# Patient Record
Sex: FEMALE | Race: WHITE | NOT HISPANIC OR LATINO | Employment: OTHER | URBAN - METROPOLITAN AREA
[De-identification: names, ages, dates, MRNs, and addresses within clinical notes are randomized per-mention and may not be internally consistent; named-entity substitution may affect disease eponyms.]

---

## 2020-08-18 ENCOUNTER — RECORDS - HEALTHEAST (OUTPATIENT)
Dept: LAB | Facility: CLINIC | Age: 80
End: 2020-08-18

## 2020-08-18 LAB
ALBUMIN SERPL-MCNC: 4.3 G/DL (ref 3.5–5)
ALP SERPL-CCNC: 68 U/L (ref 45–120)
ALT SERPL W P-5'-P-CCNC: 12 U/L (ref 0–45)
ANION GAP SERPL CALCULATED.3IONS-SCNC: 11 MMOL/L (ref 5–18)
AST SERPL W P-5'-P-CCNC: 20 U/L (ref 0–40)
BILIRUB SERPL-MCNC: 0.6 MG/DL (ref 0–1)
BUN SERPL-MCNC: 13 MG/DL (ref 8–28)
CALCIUM SERPL-MCNC: 9.3 MG/DL (ref 8.5–10.5)
CHLORIDE BLD-SCNC: 105 MMOL/L (ref 98–107)
CHOLEST SERPL-MCNC: 238 MG/DL
CO2 SERPL-SCNC: 25 MMOL/L (ref 22–31)
CREAT SERPL-MCNC: 0.7 MG/DL (ref 0.6–1.1)
FASTING STATUS PATIENT QL REPORTED: ABNORMAL
GFR SERPL CREATININE-BSD FRML MDRD: >60 ML/MIN/1.73M2
GLUCOSE BLD-MCNC: 104 MG/DL (ref 70–125)
HDLC SERPL-MCNC: 48 MG/DL
LDLC SERPL CALC-MCNC: 160 MG/DL
POTASSIUM BLD-SCNC: 4.3 MMOL/L (ref 3.5–5)
PROT SERPL-MCNC: 6.9 G/DL (ref 6–8)
SODIUM SERPL-SCNC: 141 MMOL/L (ref 136–145)
TRIGL SERPL-MCNC: 151 MG/DL

## 2021-05-13 ENCOUNTER — AMBULATORY - HEALTHEAST (OUTPATIENT)
Dept: VASCULAR SURGERY | Facility: CLINIC | Age: 81
End: 2021-05-13
Payer: COMMERCIAL

## 2021-05-13 DIAGNOSIS — S81.812A LACERATION OF LEFT LOWER LEG: ICD-10-CM

## 2021-05-20 ENCOUNTER — RECORDS - HEALTHEAST (OUTPATIENT)
Dept: ADMINISTRATIVE | Facility: OTHER | Age: 81
End: 2021-05-20

## 2021-05-20 ENCOUNTER — OFFICE VISIT - HEALTHEAST (OUTPATIENT)
Dept: VASCULAR SURGERY | Facility: CLINIC | Age: 81
End: 2021-05-20

## 2021-05-20 DIAGNOSIS — R60.9 DEPENDENT EDEMA: ICD-10-CM

## 2021-05-20 DIAGNOSIS — S80.12XS TRAUMATIC HEMATOMA OF LEFT LOWER LEG, SEQUELA: ICD-10-CM

## 2021-05-20 DIAGNOSIS — L97.922 ULCER OF LEFT LOWER LEG, WITH FAT LAYER EXPOSED (H): ICD-10-CM

## 2021-05-26 ENCOUNTER — RECORDS - HEALTHEAST (OUTPATIENT)
Dept: ADMINISTRATIVE | Facility: CLINIC | Age: 81
End: 2021-05-26

## 2021-05-27 ENCOUNTER — OFFICE VISIT - HEALTHEAST (OUTPATIENT)
Dept: VASCULAR SURGERY | Facility: CLINIC | Age: 81
End: 2021-05-27

## 2021-05-27 VITALS
RESPIRATION RATE: 16 BRPM | SYSTOLIC BLOOD PRESSURE: 120 MMHG | DIASTOLIC BLOOD PRESSURE: 60 MMHG | TEMPERATURE: 98.4 F | HEART RATE: 68 BPM

## 2021-05-27 VITALS — WEIGHT: 180.8 LBS

## 2021-05-27 DIAGNOSIS — S80.12XS TRAUMATIC HEMATOMA OF LEFT LOWER LEG, SEQUELA: ICD-10-CM

## 2021-05-27 DIAGNOSIS — R60.9 DEPENDENT EDEMA: ICD-10-CM

## 2021-05-27 DIAGNOSIS — L97.922 ULCER OF LEFT LOWER LEG, WITH FAT LAYER EXPOSED (H): ICD-10-CM

## 2021-05-31 ENCOUNTER — RECORDS - HEALTHEAST (OUTPATIENT)
Dept: ADMINISTRATIVE | Facility: CLINIC | Age: 81
End: 2021-05-31

## 2021-06-03 ENCOUNTER — OFFICE VISIT - HEALTHEAST (OUTPATIENT)
Dept: VASCULAR SURGERY | Facility: CLINIC | Age: 81
End: 2021-06-03

## 2021-06-03 DIAGNOSIS — L97.922 ULCER OF LEFT LOWER LEG, WITH FAT LAYER EXPOSED (H): ICD-10-CM

## 2021-06-03 DIAGNOSIS — R60.9 DEPENDENT EDEMA: ICD-10-CM

## 2021-06-03 DIAGNOSIS — S80.12XS TRAUMATIC HEMATOMA OF LEFT LOWER LEG, SEQUELA: ICD-10-CM

## 2021-06-08 ENCOUNTER — COMMUNICATION - HEALTHEAST (OUTPATIENT)
Dept: ADMINISTRATIVE | Facility: CLINIC | Age: 81
End: 2021-06-08

## 2021-06-16 PROBLEM — M54.30 SCIATICA: Status: ACTIVE | Noted: 2021-05-20

## 2021-06-16 PROBLEM — E78.5 HYPERLIPIDEMIA: Status: ACTIVE | Noted: 2021-05-20

## 2021-06-16 PROBLEM — M65.30 ACQUIRED TRIGGER FINGER: Status: ACTIVE | Noted: 2021-05-20

## 2021-06-17 ENCOUNTER — OFFICE VISIT - HEALTHEAST (OUTPATIENT)
Dept: VASCULAR SURGERY | Facility: CLINIC | Age: 81
End: 2021-06-17

## 2021-06-17 DIAGNOSIS — L97.921 ULCER OF LEFT LOWER LEG, LIMITED TO BREAKDOWN OF SKIN (H): ICD-10-CM

## 2021-06-17 DIAGNOSIS — R60.9 DEPENDENT EDEMA: ICD-10-CM

## 2021-06-17 NOTE — PROGRESS NOTES
"4 weeks ago a \"tree table\" fell on her left shin. She uses aquaphor. She is currently on bactrim. Non adherent gauze and abd. She is worried it is infected. It is painful, red and swollen. Washes with antibiotic soap. 8.7x2x0.2  "

## 2021-06-17 NOTE — PATIENT INSTRUCTIONS - HE
"Wound Care Instructions  LEFT LOWER LEG:  Primary Dressing: Impregnate a single piece of 2\" rolled gauze with hydrogel until it is saturated. Tuck into the base of the ulcer until the entire wound is full and the base is covered    Secondary dressing: Cover the rolled gauze dressing with non adherent surgical pad (Telfa).    Secure with rolled gauze or minimal tape    Change twice daily.    Compression:  -Apply tubular compression to left lower leg from toes to knee when out of bed. Can wear at night if tolerated.      Activity:  -Sleep in bed at night.  -Walk as much as tolerated, this is good for edema.    Bathing:  -It is ok to get your wound wet in the shower if you leave the dressings in place. Wash the wound with dilute antibacterial soap separately at the end of the shower. Change wet dressings immediately after showering.    -Do not soak ulcers.  -Do not leave open to air.     SEEK MEDICAL CARE IF:    You have an increase in swelling, pain, or redness around the wound.    You have an increase in the amount of pus coming from the wound.    There is a bad smell coming from the wound.    The wound appears to be worsening/enlarging    You have a fever greater than 101.5 F      It is ok to continue current wound care treatment/products for the next 2-3 days until new wound care supplies are ordered and arrive. If longer than this please contact our office at [phone].    Romina Mendez, MSN, CNP, CWOCN-AP  [facility]  [phone]    "

## 2021-06-25 NOTE — TELEPHONE ENCOUNTER
Caller: Patient    Provider: NICHOLAS Mendez    Detailed reason for call: Please call with an update on her wound supplies. She has not received anything yet.    Best phone number to contact: [phone]    Best time to contact: ASAP. She has many appts for  revovering from surgery and needs to address wound    Ok to leave a detailed message: Yes

## 2021-06-25 NOTE — TELEPHONE ENCOUNTER
Writer spoke with [employer] and they stated that it was delivered today.     Pt confirmed that she received the supplies.

## 2021-06-26 NOTE — PATIENT INSTRUCTIONS - HE
Wound Care Instructions  LEFT LOWER LEG:  Primary Dressing: Xeroform over the remaining ulcer    Secondary dressing: Cover non adherent surgical pad (Telfa).    Secure with rolled gauze or minimal tape    Change daily.    Toe:  Can apply AmLactin 12% lotion or cream (or generic) to callused areas of skin to soften. Daily until desired affect.       Compression:  -Apply tubular compression to left lower leg from toes to knee when out of bed. Can wear at night if tolerated.      Activity:  -Sleep in bed at night.  -Walk as much as tolerated, this is good for edema.    Bathing:  -It is ok to get your wound wet in the shower if you leave the dressings in place. Wash the wound with dilute antibacterial soap separately at the end of the shower. Change wet dressings immediately after showering.    -Do not soak ulcers.  -Do not leave open to air.     SEEK MEDICAL CARE IF:    You have an increase in swelling, pain, or redness around the wound.    You have an increase in the amount of pus coming from the wound.    There is a bad smell coming from the wound.    The wound appears to be worsening/enlarging    You have a fever greater than 101.5 F      It is ok to continue current wound care treatment/products for the next 2-3 days until new wound care supplies are ordered and arrive. If longer than this please contact our office at [phone].    Romina Mendez, MSN, CNP, CWOCN-AP  [facility]  [phone]

## 2021-06-26 NOTE — PATIENT INSTRUCTIONS - HE
"Wound Care Instructions  LEFT LOWER LEG:  Primary Dressing: Impregnate a single piece of 2\" rolled gauze with hydrogel until it is saturated. Tuck into the base of the ulcer until the entire wound is full and the base is covered    Secondary dressing: Cover the rolled gauze dressing with non adherent surgical pad (Telfa).    Secure with rolled gauze or minimal tape    Change twice daily.    Compression:  -Apply tubular compression to left lower leg from toes to knee when out of bed. Can wear at night if tolerated.      Activity:  -Sleep in bed at night.  -Walk as much as tolerated, this is good for edema.    Bathing:  -It is ok to get your wound wet in the shower if you leave the dressings in place. Wash the wound with dilute antibacterial soap separately at the end of the shower. Change wet dressings immediately after showering.    -Do not soak ulcers.  -Do not leave open to air.     SEEK MEDICAL CARE IF:    You have an increase in swelling, pain, or redness around the wound.    You have an increase in the amount of pus coming from the wound.    There is a bad smell coming from the wound.    The wound appears to be worsening/enlarging    You have a fever greater than 101.5 F      It is ok to continue current wound care treatment/products for the next 2-3 days until new wound care supplies are ordered and arrive. If longer than this please contact our office at [phone].    Romina Mendez, MSN, CNP, CWOCN-AP  [facility]  [phone]    "

## 2021-06-26 NOTE — PATIENT INSTRUCTIONS - HE
Wound Care Instructions  LEFT LOWER LEG:  Primary Dressing: Xeroform. Tuck into the base of the ulcer until the entire wound is full and the base is covered    Secondary dressing: Cover non adherent surgical pad (Telfa).    Secure with rolled gauze or minimal tape    Change daily.    Compression:  -Apply tubular compression to left lower leg from toes to knee when out of bed. Can wear at night if tolerated.      Activity:  -Sleep in bed at night.  -Walk as much as tolerated, this is good for edema.    Bathing:  -It is ok to get your wound wet in the shower if you leave the dressings in place. Wash the wound with dilute antibacterial soap separately at the end of the shower. Change wet dressings immediately after showering.    -Do not soak ulcers.  -Do not leave open to air.     SEEK MEDICAL CARE IF:    You have an increase in swelling, pain, or redness around the wound.    You have an increase in the amount of pus coming from the wound.    There is a bad smell coming from the wound.    The wound appears to be worsening/enlarging    You have a fever greater than 101.5 F      It is ok to continue current wound care treatment/products for the next 2-3 days until new wound care supplies are ordered and arrive. If longer than this please contact our office at [phone].    Romina Mendez, MSN, CNP, CWOCN-AP  [facility]  [phone]

## 2021-06-30 NOTE — PROGRESS NOTES
Progress Notes by Romina Mendez CNP at 5/20/2021  3:20 PM     Author: Romina Mendez CNP Service: -- Author Type: Nurse Practitioner    Filed: 5/20/2021  4:53 PM Encounter Date: 5/20/2021 Status: Signed    : Romina Mendez CNP (Nurse Practitioner)             [facility]  CONSULT NOTE    Date of Service:  5/20/2021    Requesting Provider: Dr. Carol Sanchez    Chief Complaint: Left lower leg ulcer    History of Present Illness: Gemma Cabrrea is being seen at [facility] today regarding non healing ulcer of the left lower leg. The patient arrives to the clinic today from home by herself. The patient does not have a significant medical history or history of any surgeries.     The patient reports she suffered a laceration about 1 month ago when a heavy outdoor table made from a tree fell on her leg. She presented to the emergency room and the laceration was cleansed and re-approximated. Sutures were removed and patient has continued to apply bacitracin ointment and Aquaphor healing ointment on non adherent dressing 1-2x daily. She cleanses with dilute antimicrobial soap with dressing change in shower. Overall, the ulcer is not improving and there was concern of infection. The patient will compete a course of trimethoprim/sulfamethoxazole (TMP-SMX, Bactrim, Septra) in the next day or so. The patient reports pain localized to the lateral area next to the ulcer.     Today, the patient denies any fevers, chills, generalized ill feeling or other acute medical concerns. Patient sleeps in bed, denies history of DVT.    I personally reviewed outside imaging, lab work, and progress noted through Care Everywhere and outside records.      Review of Systems:   Constitutional:  negative  for fever, chills or night sweats  EENTM: negative for glasses;  negative [place]  GI:  negative for nausea/vomiting; negative for constipation; negative diarrhea;  negative for fecal incontinence; negative weight loss  :  negative dysuria, negative incontinence  MS: patient is ambulatory;  does not use assistive devices; negative history of falls  Cardiac:  negative chest pain or palpitations  Respiratory:  negative shortness of breath; negative cough  Endocrine:  negative diabetes; negative thyroid disorder  Vascular: positive swelling, negative numbness  Psych:  negative acute depression/anxiety      Past Medical History:    History reviewed. No pertinent past medical history.     Surgical History:   History reviewed. No pertinent surgical history.     Medications:      Current Outpatient Medications:   ?  sulfamethoxazole-trimethoprim (SEPTRA DS) 800-160 mg per tablet, TAKE 1 TABLET BY MOUTH TWICE DAILY FOR 7 DAYS, Disp: , Rfl:   ?  HYDROcodone-acetaminophen 5-325 mg per tablet, Take 1 tablet by mouth every 8 (eight) hours as needed for pain., Disp: 10 tablet, Rfl: 0    Current Facility-Administered Medications:   ?  lidocaine 2 % jelly (XYLOCAINE), , Topical, PRN, Romina Mendez, CNP, Given at 05/20/21 9060    Allergies:   Allergies   Allergen Reactions   ? Atovaquone-Proguanil      Other reaction(s): GI upset       Family history: History reviewed. No pertinent family history.     Social History:   Social History     Socioeconomic History   ? Marital status:      Spouse name: Not on file   ? Number of children: Not on file   ? Years of education: Not on file   ? Highest education level: Not on file   Occupational History   ? Not on file   Social Needs   ? Financial resource strain: Not on file   ? Food insecurity     Worry: Not on file     Inability: Not on file   ? Transportation needs     Medical: Not on file     Non-medical: Not on file   Tobacco Use   ? Smoking status: Never Smoker   ? Smokeless tobacco: Never Used   Substance and Sexual Activity   ? Alcohol use: Not on file   ? Drug use: Not on file   ? Sexual activity: Not on file   Lifestyle   ?  Physical activity     Days per week: Not on file     Minutes per session: Not on file   ? Stress: Not on file   Relationships   ? Social connections     Talks on phone: Not on file     Gets together: Not on file     Attends Confucianist service: Not on file     Active member of club or organization: Not on file     Attends meetings of clubs or organizations: Not on file     Relationship status: Not on file   ? Intimate partner violence     Fear of current or ex partner: Not on file     Emotionally abused: Not on file     Physically abused: Not on file     Forced sexual activity: Not on file   Other Topics Concern   ? Not on file   Social History Narrative   ? Not on file       Physical Exam:  Vitals: Blood pressure 120/60, pulse 68, temperature 98.4  F (36.9  C), temperature source Oral, resp. rate 16, weight 180 lb 12.8 oz (82 kg).  General: This is a 81 y.o. female who appears their reported age, not in acute distress  Head: normocephalic, Atraumatic; not wearing glasses; non-icteric sclera; no exudate; no hearing loss  Respiratory: Clear throughout all lung fields; unlabored breathing; no cough   Cardiac: Regular, Rate and Rhythm, no murmurs appreciated   Skin: Uniformly warm and dry  Psych: Alert and oriented x4; calm and cooperative throughout exam  Abdomen: Normal bowel sounds. Soft, symmetric, no guarding or rigidity, nontender with palpation.  No organomegaly or masses palpated.   Peripheral Vascular:  bilateral dorsalis pedis, posterior tibial pulses to 3/4 by manual palpation. Good capillary refill. Positive for ankle flare, spider veins and telangiectasias . Non pitting edema of the lower legs sparing the feet and ankles.    Circumferential volume measures:  Vasc Edema 5/20/2021   Right just above MTP 22.5   Right Ankle 26.5   Right Widest Calf 42.4   Left - just above MTP 24.1   Left Ankle 26.4   Left Widest Calf 41       Ulceration(s)/Wound(s):   Wound/Ulcer location: LEFT LOWER LEG   Size: see below.    Edges: Unattached  Undermining: along the superior margin after debridement  Base: Yellow slough and necrotic adipose  Tissues: adipose  Thickness: full  Exudate Type: yellow/brown  Exudate Amount: small to moderate  Michelle-Wound/Ulcer: mild redness with resolving warmth and induration; no isolated induration or warmth over the lateral lower leg where pain is localized.   Odor: none      VASC Wound 05/20/21 Left shin (Active)   Pre Size Length 8.7 05/20/21 1500   Pre Size Width 2 05/20/21 1500   Pre Size Depth 0.2 05/20/21 1500   Pre Total Sq cm 17.4 05/20/21 1500   Post Size Length 8.7 05/20/21 1500   Post Size Width 2 05/20/21 1500   Post Size Depth 1.5 05/20/21 1500   Post Total Sq cm 17.4 05/20/21 1500       Laboratory/Diagnostic studies:   No results found for: SEDRATE  Lab Results   Component Value Date    CREATININE 0.70 08/18/2020     No results found for: HGBA1C  Lab Results   Component Value Date    BUN 13 08/18/2020     Lab Results   Component Value Date    ALBUMIN 4.3 08/18/2020     No results found for: [id number]      Diagnoses:   1. Ulcer of left lower leg, with fat layer exposed (H)  lidocaine 2 % jelly (XYLOCAINE)    Change dressing   2. Traumatic hematoma of left lower leg, sequela     3. Dependent edema          Photo:  5/20/2021  Left lower leg            Assessment/Plan:  1. Debridement: After discussion of risk factors and verbal consent was obtained 2% Lidocaine HCL jelly was applied, under clean conditions, the left lower leg ulceration(s) were debrided using currette and scissors. Devitalized and nonviable tissue, along with any fibrin and slough, was removed to improve granulation tissue formation, stimulate wound healing, decrease overall bacteria load, disrupt biofilm formation and decrease edge senescence.  Total excisional debridement was 17.4 sq cm from the epidermis/dermis area and into the subcutaneous tissue with a depth of 1.5 cm.   Ulcers were improved afterwards and .   Measures were as noted on the flow sheet.    2. Treatment: wound treatment will include irrigation and dressings to promote autolytic debridement which will include: Primary dressing of hydrogel impregnated open weave rolled gauze, secondary dressing of Telfa (non adherent surgical dressing). Twice daily.    3. Edema: Mild edema to be controlled with tubular compression. May remove at night in bed. Patient can apply intermittent heat to lateral lower leg to assist with resorption of likely underlying contusion.     4. Offloading: NA    5. Nutrition: NA    6. Diagnostics: NA    7. Medications: NA    8. Referrals: NA    9. Education: See above. Education provided regarding debridement, mechanism of dressings and expected outcomes from dressing changes. Patient verbalizes understanding and all questions answered to her satisfaction.       Impression:  Gemma Cabrera is an 81 year old patient with ulceration of the left lower leg extending into adipose tissue secondary to traumatic laceration and underlying hematoma. The patient is completing systemic antibiotics for infection. This appears to be effective. Healing will be affected by dependent lower leg edema likely mild lymphedema.    Today, this writer will debride majority of necrotic adipose tissue from the ulcer and start moist to damp dressing twice daily to promote mechanical debridement of remaining necrotic tissue. Mild dependent edema will be supported with mild compression with tubular compression. Intermittent heat is recommended to tender area likely representing underlying contusion lateral to the ulcer.       Patient to return to clinic in 1 week(s) for re-evaluation. They were instructed to call the clinic sooner with any further questions or concerns.       Romina Mendez, MSN, CNP, CWOCN-AP  [facility]  [phone]    The assessment, plan of care, and note were electronically prepared by Romina Mendez CNP, CWOCN-AP.

## 2021-07-04 NOTE — PROGRESS NOTES
Progress Notes by Romina Mendez CNP at 6/17/2021  3:40 PM     Author: Romina Mendez CNP Service: -- Author Type: Nurse Practitioner    Filed: 6/18/2021  8:50 AM Encounter Date: 6/17/2021 Status: Signed    : Romina Mendez CNP (Nurse Practitioner)               [facility]     FOLLOW UP NOTE      Date of Service: 6/17/2021    Date Last Seen: 5/20/2021; 5/20/2021    Chief Complaint: Left lower leg ulcer    Pt returns to [facility] with regards to the above concern.  The patient arrives today from home by herself. Current dressings includeXeroform and non adherent dressing, daily. This is being done by the patient. The patient is using tubular compression for control of localized edema.    The patient reports feeling well today. Denies fevers, chills. No shortness of breath. Pain is rated 2 out of 10 described as intermittent, sharp pain along the lateral adjacent tissue.      Allergies: Atovaquone-proguanil      Medications:   Current Outpatient Medications:   ?  HYDROcodone-acetaminophen 5-325 mg per tablet, Take 1 tablet by mouth every 8 (eight) hours as needed for pain., Disp: 10 tablet, Rfl: 0    Current Facility-Administered Medications:   ?  lidocaine 2 % jelly (XYLOCAINE), , Topical, PRN, Romina Mendez CNP, 1 application at 06/03/21 1431      History: No past medical history on file.      Physical Exam:    /66   Pulse 80   Temp 98.4  F (36.9  C)     General:  Patient presents to clinic in no apparent distress.  Head: normocephalic atraumatic  Psychiatric:  Alert and oriented x3.   Respiratory: unlabored breathing; no cough  Integumentary:  Skin is uniformly warm, dry and pink.    Peripheral Vascular: Reduced edema especially near the ulcer.     Circumferential volume measures:  Vasc Edema 5/20/2021 5/27/2021   Right just above MTP 22.5 22.4   Right Ankle 26.5 25.8   Right Widest Calf 42.4 40.8   Left - just  above MTP 24.1 22.4   Left Ankle 26.4 24.4   Left Widest Calf 41 39.0       Ulceration(s)/Wound(s):   Wound/Ulcer location: LEFT LOWER LEG   Size: see below.   Edges:  Attached.  Undermining: none  Base: red granular with minimal yellow slough  Tissues: dermis  Thickness: partial  Exudate Type: serosanguineous  Exudate Amount: scant  Michelle-Wound/Ulcer: no erythema; no isolated induration or warmth over the lateral lower leg where pain is localized.   Odor: none    VASC Wound 05/20/21 Left shin (Active)   Pre Size Length 0.1 06/17/21 1500   Pre Size Width 0.1 06/17/21 1500   Pre Size Depth 0.1 06/17/21 1500   Pre Total Sq cm 0.1 06/17/21 1500   Post Size Length 0.4 06/17/21 1500   Post Size Width 1.2 06/17/21 1500   Post Size Depth 0.2 06/17/21 1500   Post Total Sq cm 0.48 06/17/21 1500   Description measured diagonally 06/03/21 1400        Laboratory/Diagnostics studes:  None to review.      Diagnoses:  1. Ulcer of left lower leg, limited to breakdown of skin (H)     2. Dependent edema         Photo:  6/17/2021  Left lower leg        Are any of these wounds new today: No.      Assessment/Plan:  1. Debridement: After discussion of risk factors and verbal consent was obtained 2% Lidocaine HCL jelly was applied, under clean conditions, the left lower leg ulceration(s) were debrided using currette. Devitalized and nonviable tissue, along with any fibrin and slough, was removed to improve granulation tissue formation, stimulate wound healing, decrease overall bacteria load, disrupt biofilm formation and decrease edge senescence.  Total excisional debridement was 0.48 sq cm from the epidermis/dermis area with a depth of 0.2 cm.   Ulcers were improved afterwards and .  Measures were as noted on the flow sheet.    2. Treatment: wound treatment will include irrigation and dressings to promote autolytic debridement which will include: Primary dressing of Xeroform and secondary dressing of Telfa (non adherent surgical  dressing). Daily.    3. Edema: Continue tubular compression per patient comfort. Order placed for dual layer compression stockings.     4. Offloading: na    5. Nutrition: na    6. Diagnostics: na    7. Medications: na    8. Referrals: na    9. Education: See above. Education provided regarding continued plan of care. Patient verbalizes understanding and all questions answered to their satisfaction.        Impression:  Gemma Cabrera is an 81 year old patient with ulceration of the left lower leg extending into adipose tissue secondary to traumatic laceration and underlying hematoma. The patient has completed systemic antibiotics for infection. Healing will be affected by dependent lower leg edema likely mild lymphedema.     Today, the left lower leg ulcer is smaller, limited to skin breakdown. There are no signs of infection.  Mild dependent edema will be supported with tubular compression. Intermittent pain is likely due to edema and localized neuropathy which will resolve with reduction of inflammation and edema. Discussed with the patient that it takes a minimum of 3 months to allow any nerve injury to recovery before diagnosing it as formal nerve injury.     Patient will follow up with me in 3 weeks for reevaluation. I anticipate the ulcer will be closed at that time. They were instructed to call the clinic sooner with any signs or symptoms of infection or any further questions/concerns. Answered all questions.      Romina Mendez, MSN, CNP, CWOCN-AP  [facility]  [phone]     The assessment, plan of care, and note were electronically prepared by Romina Mendez, CNP, CWOCN-AP.

## 2021-07-04 NOTE — ADDENDUM NOTE
Addendum Note by Laura Pat CMA at 5/27/2021  3:00 PM     Author: Laura Pat CMA Service: -- Author Type: Certified Medical Assistant    Filed: 5/27/2021  4:03 PM Encounter Date: 5/27/2021 Status: Signed    : Laura Pat CMA (Certified Medical Assistant)    Addended by: LAURA PAT on: 5/27/2021 04:03 PM        Modules accepted: Orders

## 2021-07-04 NOTE — LETTER
Letter by Romina Mendez CNP at      Author: Romina Mendez CNP Service: -- Author Type: --    Filed:  Encounter Date: 6/3/2021 Status: (Other)       6/3/2021    [facility]   Fax: [phone] Wound Dressing Rx and Order Form  Customer Service: [phone] Order Status: New Order   Verbal: Vanna     Patient Info:  Name: Gemma Cabrera  : 1940  Address:   [address] [place] [address]  Phone: [phone]      Insurance Info:  Primary: Payor: MEDICARE / Plan: MEDICARE A AND B / Product Type: Medicare /    Secondary: AARP AARP  [id number] - (Medicare)      Physician Info:   Name:  Romina Mendez CNP   Dept Address/Phones:   [address] [place] [address], [facility] [address]  [facility] [address] [phone]  Fax: [phone]    Lymphedema circumferential measurements (in cm):  Right just above MTP: 22.4    Right Ankle: 25.8    Right Widest Calf: 40.8    No data recorded  Left - just above MTP: 22.4    Left Ankle: 24.4    Left Widest Calf: 39.0    No data recorded    Wound info:  Encounter Diagnoses   Name Primary?   ? Ulcer of left lower leg, with fat layer exposed (H) Yes   ? Traumatic hematoma of left lower leg, sequela    ? Dependent edema      VASC Wound 21 Left shin (Active)   Pre Size Length 6 21 1400   Pre Size Width 1.5 21 1400   Pre Size Depth 0.2 21 1400   Pre Total Sq cm 9 21 1400   Post Size Length 7 21 1500   Post Size Width 1.5 21 1500   Post Size Depth 0.3 21 1500   Post Total Sq cm 10.5 21 1500   Description measured diagonally 21 1400     Drainage: Moderate  Thickness:  Full  Duration of Need: Lifetime  Days Supply: Lifetime  Start Date: 6/3/2021  Starter Kit: Yes  Qualifying wound/Debridement Yes      Dressing Type Brand Size Number of pieces Frequency of change   Primary Dual layer compression  Mediven Leg measures above  Send 1 pair    Xeroform  5  "\" x 9\" Send 10 Daily   Secondary NO       Tape Paper  1\" Send 2 Daily     Note: If total out of pocket is more than $50.00 please contact the patient before processing order.     OK to forward to covered supplier.     Electronically Signed Physician: Romina Mendez CNP Date: 6/3/2021       "

## 2021-07-04 NOTE — PROGRESS NOTES
Progress Notes by Romina Mendez CNP at 5/27/2021  3:00 PM     Author: Romina Mendez CNP Service: -- Author Type: Nurse Practitioner    Filed: 5/27/2021  3:50 PM Encounter Date: 5/27/2021 Status: Signed    : Romina Mendez CNP (Nurse Practitioner)               [facility]ASC[facility]     FOLLOW UP NOTE      Date of Service: 5/27/2021    Date Last Seen: 5/20/2021; 5/20/2021    Chief Complaint: Left lower leg ulcer    Pt returns to [facility] with regards to the above concern.  The patient arrives today from home by herself. Current dressings include open weave gauze impregnated with hydrogel and non adherent dressing, twice daily . This is being done by the patient. They are using tubular compression for control of localized edema.    The patient reports feeling well today. Denies fevers, chills. No shortness of breath. Pain is rated 2 out of 10 described as intermittent, sharp pain along the lateral adjacent tissue.      Allergies: Atovaquone-proguanil      Medications:   Current Outpatient Medications:   ?  HYDROcodone-acetaminophen 5-325 mg per tablet, Take 1 tablet by mouth every 8 (eight) hours as needed for pain., Disp: 10 tablet, Rfl: 0  ?  sulfamethoxazole-trimethoprim (SEPTRA DS) 800-160 mg per tablet, TAKE 1 TABLET BY MOUTH TWICE DAILY FOR 7 DAYS, Disp: , Rfl:     Current Facility-Administered Medications:   ?  lidocaine 2 % jelly (XYLOCAINE), , Topical, PRN, Romina Mendez CNP, Given at 05/20/21 1527      History: History reviewed. No pertinent past medical history.      Physical Exam:    /84 (Patient Site: Left Arm, Patient Position: Semi-romero, Cuff Size: Adult Regular)   Temp 97.2  F (36.2  C) (Temporal)   Resp 18     General:  Patient presents to clinic in no apparent distress.  Head: normocephalic atraumatic  Psychiatric:  Alert and oriented x3.   Respiratory: unlabored breathing; no cough  Integumentary:  Skin  is uniformly warm, dry and pink.      Circumferential volume measures:  Vasc Edema 5/20/2021 5/27/2021   Right just above MTP 22.5 22.4   Right Ankle 26.5 25.8   Right Widest Calf 42.4 40.8   Left - just above MTP 24.1 22.4   Left Ankle 26.4 24.4   Left Widest Calf 41 39.0       Ulceration(s)/Wound(s):   Wound/Ulcer location: LEFT LOWER LEG   Size: see below.   Edges:  Attached with epithelial bridge.  Undermining: none  Base: Adherent yellow slough in deeper are with red, granular tissue along the lateral edge.   Tissues: adipose  Thickness: full  Exudate Type: serosanguineous  Exudate Amount: small to moderate  Michelle-Wound/Ulcer: no erythema; no isolated induration or warmth over the lateral lower leg where pain is localized.   Odor: none    VASC Wound 05/20/21 Left shin (Active)   Pre Size Length 7 05/27/21 1500   Pre Size Width 1.5 05/27/21 1500   Pre Size Depth 0.2 05/27/21 1500   Pre Total Sq cm 10.5 05/27/21 1500   Post Size Length 7 05/27/21 1500   Post Size Width 1.5 05/27/21 1500   Post Size Depth 0.3 05/27/21 1500   Post Total Sq cm 10.5 05/27/21 1500        Laboratory/Diagnostics studes:  None to review.      Diagnoses:  1. Ulcer of left lower leg, with fat layer exposed (H)     2. Traumatic hematoma of left lower leg, sequela     3. Dependent edema         Photo:  5/27/2021  Left lower leg        Are any of these wounds new today: No.      Assessment/Plan:  1. Debridement: After discussion of risk factors and verbal consent was obtained 2% Lidocaine HCL jelly was applied, under clean conditions, the left lower leg ulceration(s) were debrided using currette. Devitalized and nonviable tissue, along with any fibrin and slough, was removed to improve granulation tissue formation, stimulate wound healing, decrease overall bacteria load, disrupt biofilm formation and decrease edge senescence.  Total excisional debridement was 10.5 sq cm from the epidermis/dermis area and into the subcutaneous tissue with a  depth of 0.3 cm.   Ulcers were improved afterwards and .  Measures were as noted on the flow sheet.    2. Treatment: wound treatment will include irrigation and dressings to promote autolytic debridement which will include: continue primary dressing of hydrogel impregnated open weave rolled gauze, secondary dressing of Telfa (non adherent surgical dressing). Twice daily.    3. Edema: Continue tubular compression per patient comfort.     4. Offloading: na    5. Nutrition: na    6. Diagnostics: na    7. Medications: na    8. Referrals: na    9. Education: See above. Education provided regarding continued plan of care. Patient verbalizes understanding and all questions answered to their satisfaction.        Impression:  Gemma Cabrera is an 81 year old patient with ulceration of the left lower leg extending into adipose tissue secondary to traumatic laceration and underlying hematoma. The patient has completed systemic antibiotics for infection. Healing will be affected by dependent lower leg edema likely mild lymphedema.     Today, the left lower leg ulcer is smaller with new epithelial bridge. Majority of the undermining has tacked down. There is new granular tissue along the lateral border. Mild dependent edema will be supported with mild compression with tubular compression. Intermittent pain is likely due to edema and localized neuropathy which will resolve with reduction of inflammation and edema.       Patient will follow up with me in 1 weeks for reevaluation. They were instructed to call the clinic sooner with any signs or symptoms of infection or any further questions/concerns. Answered all questions.      Romina Mendez, MSN, CNP, CWOCN-AP  [facility]  [phone]     The assessment, plan of care, and note were electronically prepared by Romina Mendez, NICHOLAS, CWOCN-AP.

## 2021-07-04 NOTE — PROGRESS NOTES
Progress Notes by Romina Mendez CNP at 6/3/2021  2:20 PM     Author: Romina Mendez CNP Service: -- Author Type: Nurse Practitioner    Filed: 6/4/2021  7:49 AM Encounter Date: 6/3/2021 Status: Signed    : Romina Mendez CNP (Nurse Practitioner)               [facility]ULAR [facility]     FOLLOW UP NOTE      Date of Service: 6/3/2021    Date Last Seen: 5/20/2021; 5/20/2021    Chief Complaint: Left lower leg ulcer    Pt returns to [facility] with regards to the above concern.  The patient arrives today from home by herself. Current dressings include open weave gauze impregnated with hydrogel and non adherent dressing, twice daily . This is being done by the patient. They are using tubular compression for control of localized edema.    The patient reports feeling well today. Denies fevers, chills. No shortness of breath. Pain is rated 2 out of 10 described as intermittent, sharp pain along the lateral adjacent tissue.      Allergies: Atovaquone-proguanil      Medications:   Current Outpatient Medications:   ?  HYDROcodone-acetaminophen 5-325 mg per tablet, Take 1 tablet by mouth every 8 (eight) hours as needed for pain., Disp: 10 tablet, Rfl: 0    Current Facility-Administered Medications:   ?  lidocaine 2 % jelly (XYLOCAINE), , Topical, PRN, Romina Mendez CNP, Given at 05/20/21 1527      History: No past medical history on file.      Physical Exam:    /76   Pulse 60   Temp 98.7  F (37.1  C) (Oral)   Resp 16     General:  Patient presents to clinic in no apparent distress.  Head: normocephalic atraumatic  Psychiatric:  Alert and oriented x3.   Respiratory: unlabored breathing; no cough  Integumentary:  Skin is uniformly warm, dry and pink.      Circumferential volume measures:  Vasc Edema 5/20/2021 5/27/2021   Right just above MTP 22.5 22.4   Right Ankle 26.5 25.8   Right Widest Calf 42.4 40.8   Left - just above MTP 24.1 22.4    Left Ankle 26.4 24.4   Left Widest Calf 41 39.0       Ulceration(s)/Wound(s):   Wound/Ulcer location: LEFT LOWER LEG   Size: see below.   Edges:  Attached with epithelial bridge.  Undermining: none  Base: pink granular with minimal yellow slough  Tissues: adipose  Thickness: full  Exudate Type: serosanguineous  Exudate Amount: small to moderate  Michelle-Wound/Ulcer: no erythema; no isolated induration or warmth over the lateral lower leg where pain is localized.   Odor: none    VASC Wound 05/20/21 Left shin (Active)   Pre Size Length 6 06/03/21 1400   Pre Size Width 1.5 06/03/21 1400   Pre Size Depth 0.2 06/03/21 1400   Pre Total Sq cm 9 06/03/21 1400   Post Size Length 7 05/27/21 1500   Post Size Width 1.5 05/27/21 1500   Post Size Depth 0.3 05/27/21 1500   Post Total Sq cm 10.5 05/27/21 1500   Description measured diagonally 06/03/21 1400        Laboratory/Diagnostics studes:  None to review.      Diagnoses:  1. Ulcer of left lower leg, with fat layer exposed (H)     2. Traumatic hematoma of left lower leg, sequela     3. Dependent edema         Photo:  6/3/2021  Left lower leg        Are any of these wounds new today: No.      Assessment/Plan:  1. Debridement: NA    2. Treatment: wound treatment will include irrigation and dressings to promote autolytic debridement which will include: Primary dressing of Xeroform and secondary dressing of Telfa (non adherent surgical dressing). Daily.    3. Edema: Continue tubular compression per patient comfort. Order placed for dual layer compression stockings.     4. Offloading: na    5. Nutrition: na    6. Diagnostics: na    7. Medications: na    8. Referrals: na    9. Education: See above. Education provided regarding continued plan of care. Patient verbalizes understanding and all questions answered to their satisfaction.        Impression:  Gemma Cabrera is an 81 year old patient with ulceration of the left lower leg extending into adipose tissue secondary to traumatic  laceration and underlying hematoma. The patient has completed systemic antibiotics for infection. Healing will be affected by dependent lower leg edema likely mild lymphedema.     Today, the left lower leg ulcer is smaller with extending epithelial bridge. Ulcer extends to adipose tissue in small area, remaining base is limited to skin breakdown. There are no signs of infection.  Mild dependent edema will be supported with tubular compression. Intermittent pain is likely due to edema and localized neuropathy which will resolve with reduction of inflammation and edema. Discussed with the patient that it takes a minimum of 3 months to allow any nerve injury to recovery before diagnosing it as formal nerve injury.     Patient will follow up with me in 2 weeks for reevaluation. They were instructed to call the clinic sooner with any signs or symptoms of infection or any further questions/concerns. Answered all questions.      Romina Mendez, MSN, CNP, CWOCN-AP  [facility]  [phone]     The assessment, plan of care, and note were electronically prepared by Romina Mendez, NICHOLAS, CWOCN-AP.

## 2021-07-07 ENCOUNTER — COMMUNICATION - HEALTHEAST (OUTPATIENT)
Dept: VASCULAR SURGERY | Facility: CLINIC | Age: 81
End: 2021-07-07

## 2021-07-07 NOTE — TELEPHONE ENCOUNTER
Telephone Encounter by Lorri Moe Aide at 7/7/2021  1:14 PM     Author: Lorri Moe Aide Service: -- Author Type: Aide    Filed: 7/7/2021  1:17 PM Encounter Date: 7/7/2021 Status: Signed    : Lorri Moe Aide (Aide)       Writer called patient to confirm apt with Romina tomorrow, she will be there.  She is wondering if she can get an order for compressions stockings.

## 2021-07-08 ENCOUNTER — COMMUNICATION - HEALTHEAST (OUTPATIENT)
Dept: VASCULAR SURGERY | Facility: CLINIC | Age: 81
End: 2021-07-08

## 2021-07-08 NOTE — TELEPHONE ENCOUNTER
Telephone Encounter by Vanna Martínez LPN at 7/8/2021  3:20 PM     Author: Vanna Martínez LPN Service: -- Author Type: Licensed Nurse    Filed: 7/8/2021  3:23 PM Encounter Date: 7/8/2021 Status: Signed    : Vanna Martínez LPN (Licensed Nurse)       Left message with Yossi oliver [facility] to find out about compression wraps that were ordered on 6/3/21 since the patient did not receive them. Asked for a call back to the clinic or to the patient to figure out why she did not get these.

## 2021-07-09 NOTE — TELEPHONE ENCOUNTER
Telephone Encounter by Jas Villeda at 7/9/2021  7:26 AM     Author: Jas Villeda Service: -- Author Type: --    Filed: 7/9/2021  7:28 AM Encounter Date: 7/8/2021 Status: Signed    : Jas Villeda       Yossi left a message 7/8/21 evening at 1931 stating she spoke to pt to confirm she has not received supplies yet and that there will be a shipment being sent out to pt today 7/9/21. Yossi stated pt will receive supplies tomorrow 7/10/21. Yossi's call back number is [phone].

## 2021-08-05 ENCOUNTER — OFFICE VISIT (OUTPATIENT)
Dept: PODIATRY | Facility: CLINIC | Age: 81
End: 2021-08-05
Payer: MEDICARE

## 2021-08-05 VITALS — HEART RATE: 78 BPM | BODY MASS INDEX: 29.16 KG/M2 | WEIGHT: 175 LBS | HEIGHT: 65 IN | OXYGEN SATURATION: 97 %

## 2021-08-05 DIAGNOSIS — L60.2 ONYCHAUXIS: ICD-10-CM

## 2021-08-05 DIAGNOSIS — B35.1 NAIL FUNGUS: Primary | ICD-10-CM

## 2021-08-05 PROCEDURE — 99202 OFFICE O/P NEW SF 15 MIN: CPT | Mod: 25 | Performed by: PODIATRIST

## 2021-08-05 PROCEDURE — 11721 DEBRIDE NAIL 6 OR MORE: CPT | Performed by: PODIATRIST

## 2021-08-05 ASSESSMENT — MIFFLIN-ST. JEOR: SCORE: 1259.67

## 2021-08-05 NOTE — LETTER
8/5/2021         RE: Gemma Cabrera  [address] [place] [address]        Dear Colleague,    Thank you for referring your patient, Gemma Cabrera, to the [facility]. Please see a copy of my visit note below.    FOOT AND ANKLE SURGERY/PODIATRY CONSULT NOTE         ASSESSMENT:   Onychomycosis  Onychauxis      TREATMENT:  Debrided the nails 1 through 5 both feet today.  I recommended the patient return to the clinic every 2 months for continued foot care        HPI:Gemma Cabrera presented to the clinic today complaining of very thick painful toenails both feet.  The patient stated that she has difficulty trimming these thick toenails.  The second toenails are particularly painful because she has a hammertoe.  The nails are aggravated by her shoe gear.  It is an aching type pain which is only relieved with nonweightbearing.  She has not had any redness, swelling, drainage or bleeding surrounding the nails.  She denies any other previous treatment..       History reviewed. No pertinent past medical history.    Social History     Socioeconomic History     Marital status:      Spouse name: Not on file     Number of children: Not on file     Years of education: Not on file     Highest education level: Not on file   Occupational History     Not on file   Tobacco Use     Smoking status: Never Smoker     Smokeless tobacco: Never Used   Substance and Sexual Activity     Alcohol use: Not on file     Drug use: Not on file     Sexual activity: Not on file   Other Topics Concern     Not on file   Social History Narrative     Not on file     Social Determinants of Health     Financial Resource Strain:      Difficulty of Paying Living Expenses:    Food Insecurity:      Worried About Running Out of Food in the Last Year:      Ran Out of Food in the Last Year:    Transportation Needs:      Lack of Transportation (Medical):      Lack of Transportation (Non-Medical):    Physical  Activity:      Days of Exercise per Week:      Minutes of Exercise per Session:    Stress:      Feeling of Stress :    Social Connections:      Frequency of Communication with Friends and Family:      Frequency of Social Gatherings with Friends and Family:      Attends Gnosticist Services:      Active Member of Clubs or Organizations:      Attends Club or Organization Meetings:      Marital Status:    Intimate Partner Violence:      Fear of Current or Ex-Partner:      Emotionally Abused:      Physically Abused:      Sexually Abused:           Allergies   Allergen Reactions     Atovaquone-Proguanil [Malarone] Unknown     Other reaction(s): GI upset          Current Outpatient Medications:      HYDROcodone-acetaminophen 5-325 mg per tablet, [HYDROCODONE-ACETAMINOPHEN 5-325 MG PER TABLET] Take 1 tablet by mouth every 8 (eight) hours as needed for pain., Disp: 10 tablet, Rfl: 0     Family History   Problem Relation Age of Onset     Diabetes Sister         Social History     Socioeconomic History     Marital status:      Spouse name: Not on file     Number of children: Not on file     Years of education: Not on file     Highest education level: Not on file   Occupational History     Not on file   Tobacco Use     Smoking status: Never Smoker     Smokeless tobacco: Never Used   Substance and Sexual Activity     Alcohol use: Not on file     Drug use: Not on file     Sexual activity: Not on file   Other Topics Concern     Not on file   Social History Narrative     Not on file     Social Determinants of Health     Financial Resource Strain:      Difficulty of Paying Living Expenses:    Food Insecurity:      Worried About Running Out of Food in the Last Year:      Ran Out of Food in the Last Year:    Transportation Needs:      Lack of Transportation (Medical):      Lack of Transportation (Non-Medical):    Physical Activity:      Days of Exercise per Week:      Minutes of Exercise per Session:    Stress:      Feeling of  Stress :    Social Connections:      Frequency of Communication with Friends and Family:      Frequency of Social Gatherings with Friends and Family:      Attends Orthodox Services:      Active Member of Clubs or Organizations:      Attends Club or Organization Meetings:      Marital Status:    Intimate Partner Violence:      Fear of Current or Ex-Partner:      Emotionally Abused:      Physically Abused:      Sexually Abused:         Review of Systems - Patient denies fever, chills, rash, wound, stiffness, limping, numbness, weakness, heart burn, blood in stool, chest pain with activity, calf pain when walking, shortness of breath with activity, chronic cough, easy bleeding/bruising, swelling of ankles, excessive thirst, fatigue, depression, anxiety.  Patient admits to painful thick toenail right great toe.      OBJECTIVE:  Appearance: alert, well appearing, and in no distress.    There were no vitals taken for this visit.     There is no height or weight on file to calculate BMI.     General appearance: Patient is alert and fully cooperative with history & exam.  No sign of distress is noted during the visit.  Psychiatric: Affect is pleasant & appropriate.  Patient appears motivated to improve health.  Respiratory: Breathing is regular & unlabored while sitting.  HEENT: Hearing is intact to spoken word.  Speech is clear.  No gross evidence of visual impairment that would impact ambulation.    Vascular: Dorsalis pedis and posterior tibial pulses are palpable. There is no pedal hair growth bilaterally.  CFT < 3 sec from anterior tibial surface to distal digits bilaterally. There is no appreciable edema noted.  Dermatologic: Thick discolored dystrophic nails 1 through 5 both feet.  All nails are 2-3 times normal thickness with subungual debris present.  Turgor and texture are within normal limits. No coloration or temperature changes. No primary or secondary lesions noted.  Neurologic: All epicritic and  proprioceptive sensations are grossly intact bilaterally.  Musculoskeletal: All active and passive ankle, subtalar, midtarsal, and 1st MPJ range of motion are grossly intact without pain or crepitus, with the exception of none. Manual muscle strength is within normal limits bilaterally. All dorsiflexors, plantarflexors, invertors, evertors are intact bilaterally. Tenderness present to the second toenail of feet on palpation.  Calf is soft/non-tender without warmth/induration    Imaging:       No images are attached to the encounter or orders placed in the encounter.     No results found.   No results found.       Gerry Rodrigues DPM  [facility]         Again, thank you for allowing me to participate in the care of your patient.        Sincerely,        Gerry Wang DPM

## 2021-08-05 NOTE — PROGRESS NOTES
FOOT AND ANKLE SURGERY/PODIATRY CONSULT NOTE         ASSESSMENT:   Onychomycosis  Onychauxis      TREATMENT:  Debrided the nails 1 through 5 both feet today.  I recommended the patient return to the clinic every 2 months for continued foot care        HPI:Gemma Cabrera presented to the clinic today complaining of very thick painful toenails both feet.  The patient stated that she has difficulty trimming these thick toenails.  The second toenails are particularly painful because she has a hammertoe.  The nails are aggravated by her shoe gear.  It is an aching type pain which is only relieved with nonweightbearing.  She has not had any redness, swelling, drainage or bleeding surrounding the nails.  She denies any other previous treatment..       History reviewed. No pertinent past medical history.    Social History     Socioeconomic History     Marital status:      Spouse name: Not on file     Number of children: Not on file     Years of education: Not on file     Highest education level: Not on file   Occupational History     Not on file   Tobacco Use     Smoking status: Never Smoker     Smokeless tobacco: Never Used   Substance and Sexual Activity     Alcohol use: Not on file     Drug use: Not on file     Sexual activity: Not on file   Other Topics Concern     Not on file   Social History Narrative     Not on file     Social Determinants of Health     Financial Resource Strain:      Difficulty of Paying Living Expenses:    Food Insecurity:      Worried About Running Out of Food in the Last Year:      Ran Out of Food in the Last Year:    Transportation Needs:      Lack of Transportation (Medical):      Lack of Transportation (Non-Medical):    Physical Activity:      Days of Exercise per Week:      Minutes of Exercise per Session:    Stress:      Feeling of Stress :    Social Connections:      Frequency of Communication with Friends and Family:      Frequency of Social Gatherings with Friends and Family:       Attends Shinto Services:      Active Member of Clubs or Organizations:      Attends Club or Organization Meetings:      Marital Status:    Intimate Partner Violence:      Fear of Current or Ex-Partner:      Emotionally Abused:      Physically Abused:      Sexually Abused:           Allergies   Allergen Reactions     Atovaquone-Proguanil [Malarone] Unknown     Other reaction(s): GI upset          Current Outpatient Medications:      HYDROcodone-acetaminophen 5-325 mg per tablet, [HYDROCODONE-ACETAMINOPHEN 5-325 MG PER TABLET] Take 1 tablet by mouth every 8 (eight) hours as needed for pain., Disp: 10 tablet, Rfl: 0     Family History   Problem Relation Age of Onset     Diabetes Sister         Social History     Socioeconomic History     Marital status:      Spouse name: Not on file     Number of children: Not on file     Years of education: Not on file     Highest education level: Not on file   Occupational History     Not on file   Tobacco Use     Smoking status: Never Smoker     Smokeless tobacco: Never Used   Substance and Sexual Activity     Alcohol use: Not on file     Drug use: Not on file     Sexual activity: Not on file   Other Topics Concern     Not on file   Social History Narrative     Not on file     Social Determinants of Health     Financial Resource Strain:      Difficulty of Paying Living Expenses:    Food Insecurity:      Worried About Running Out of Food in the Last Year:      Ran Out of Food in the Last Year:    Transportation Needs:      Lack of Transportation (Medical):      Lack of Transportation (Non-Medical):    Physical Activity:      Days of Exercise per Week:      Minutes of Exercise per Session:    Stress:      Feeling of Stress :    Social Connections:      Frequency of Communication with Friends and Family:      Frequency of Social Gatherings with Friends and Family:      Attends Shinto Services:      Active Member of Clubs or Organizations:      Attends Club or  Organization Meetings:      Marital Status:    Intimate Partner Violence:      Fear of Current or Ex-Partner:      Emotionally Abused:      Physically Abused:      Sexually Abused:         Review of Systems - Patient denies fever, chills, rash, wound, stiffness, limping, numbness, weakness, heart burn, blood in stool, chest pain with activity, calf pain when walking, shortness of breath with activity, chronic cough, easy bleeding/bruising, swelling of ankles, excessive thirst, fatigue, depression, anxiety.  Patient admits to painful thick toenail right great toe.      OBJECTIVE:  Appearance: alert, well appearing, and in no distress.    There were no vitals taken for this visit.     There is no height or weight on file to calculate BMI.     General appearance: Patient is alert and fully cooperative with history & exam.  No sign of distress is noted during the visit.  Psychiatric: Affect is pleasant & appropriate.  Patient appears motivated to improve health.  Respiratory: Breathing is regular & unlabored while sitting.  HEENT: Hearing is intact to spoken word.  Speech is clear.  No gross evidence of visual impairment that would impact ambulation.    Vascular: Dorsalis pedis and posterior tibial pulses are palpable. There is no pedal hair growth bilaterally.  CFT < 3 sec from anterior tibial surface to distal digits bilaterally. There is no appreciable edema noted.  Dermatologic: Thick discolored dystrophic nails 1 through 5 both feet.  All nails are 2-3 times normal thickness with subungual debris present.  Turgor and texture are within normal limits. No coloration or temperature changes. No primary or secondary lesions noted.  Neurologic: All epicritic and proprioceptive sensations are grossly intact bilaterally.  Musculoskeletal: All active and passive ankle, subtalar, midtarsal, and 1st MPJ range of motion are grossly intact without pain or crepitus, with the exception of none. Manual muscle strength is within  normal limits bilaterally. All dorsiflexors, plantarflexors, invertors, evertors are intact bilaterally. Tenderness present to the second toenail of feet on palpation.  Calf is soft/non-tender without warmth/induration    Imaging:       No images are attached to the encounter or orders placed in the encounter.     No results found.   No results found.       Gerry Rodrigues DPM  [facility] Foot & Ankle Surgery/Podiatry

## 2022-01-18 VITALS
HEART RATE: 68 BPM | DIASTOLIC BLOOD PRESSURE: 60 MMHG | BODY MASS INDEX: 30.09 KG/M2 | SYSTOLIC BLOOD PRESSURE: 120 MMHG | WEIGHT: 180.8 LBS | TEMPERATURE: 98.4 F | RESPIRATION RATE: 16 BRPM

## 2022-01-18 VITALS
DIASTOLIC BLOOD PRESSURE: 76 MMHG | HEART RATE: 60 BPM | SYSTOLIC BLOOD PRESSURE: 138 MMHG | RESPIRATION RATE: 16 BRPM | TEMPERATURE: 98.7 F

## 2022-01-18 VITALS
RESPIRATION RATE: 18 BRPM | TEMPERATURE: 98.4 F | DIASTOLIC BLOOD PRESSURE: 84 MMHG | HEART RATE: 80 BPM | SYSTOLIC BLOOD PRESSURE: 128 MMHG | DIASTOLIC BLOOD PRESSURE: 66 MMHG | SYSTOLIC BLOOD PRESSURE: 120 MMHG | TEMPERATURE: 97.2 F

## 2022-08-10 ENCOUNTER — LAB REQUISITION (OUTPATIENT)
Dept: LAB | Facility: CLINIC | Age: 82
End: 2022-08-10
Payer: MEDICARE

## 2022-08-10 DIAGNOSIS — R19.7 DIARRHEA, UNSPECIFIED: ICD-10-CM

## 2022-08-10 LAB
ALBUMIN SERPL BCG-MCNC: 4.6 G/DL (ref 3.5–5.2)
ALP SERPL-CCNC: 61 U/L (ref 35–104)
ALT SERPL W P-5'-P-CCNC: 22 U/L (ref 10–35)
ANION GAP SERPL CALCULATED.3IONS-SCNC: 9 MMOL/L (ref 7–15)
AST SERPL W P-5'-P-CCNC: 32 U/L (ref 10–35)
BILIRUB SERPL-MCNC: 0.5 MG/DL
BUN SERPL-MCNC: 12.7 MG/DL (ref 8–23)
CALCIUM SERPL-MCNC: 9.4 MG/DL (ref 8.8–10.2)
CHLORIDE SERPL-SCNC: 103 MMOL/L (ref 98–107)
CREAT SERPL-MCNC: 0.66 MG/DL (ref 0.51–0.95)
DEPRECATED HCO3 PLAS-SCNC: 28 MMOL/L (ref 22–29)
GFR SERPL CREATININE-BSD FRML MDRD: 87 ML/MIN/1.73M2
GLUCOSE SERPL-MCNC: 103 MG/DL (ref 70–99)
POTASSIUM SERPL-SCNC: 4.2 MMOL/L (ref 3.4–5.3)
PROT SERPL-MCNC: 6.5 G/DL (ref 6.4–8.3)
SODIUM SERPL-SCNC: 140 MMOL/L (ref 136–145)

## 2022-08-10 PROCEDURE — U0005 INFEC AGEN DETEC AMPLI PROBE: HCPCS | Mod: ORL | Performed by: FAMILY MEDICINE

## 2022-08-10 PROCEDURE — 80053 COMPREHEN METABOLIC PANEL: CPT | Mod: ORL | Performed by: FAMILY MEDICINE

## 2022-08-11 ENCOUNTER — LAB REQUISITION (OUTPATIENT)
Dept: LAB | Facility: CLINIC | Age: 82
End: 2022-08-11
Payer: MEDICARE

## 2022-08-11 DIAGNOSIS — R19.7 DIARRHEA, UNSPECIFIED: ICD-10-CM

## 2022-08-11 LAB — SARS-COV-2 RNA RESP QL NAA+PROBE: NEGATIVE

## 2022-08-11 PROCEDURE — 87506 IADNA-DNA/RNA PROBE TQ 6-11: CPT | Mod: ORL | Performed by: FAMILY MEDICINE

## 2023-10-16 ENCOUNTER — LAB REQUISITION (OUTPATIENT)
Dept: LAB | Facility: CLINIC | Age: 83
End: 2023-10-16
Payer: COMMERCIAL

## 2023-10-16 DIAGNOSIS — E78.5 HYPERLIPIDEMIA, UNSPECIFIED: ICD-10-CM

## 2023-10-16 PROCEDURE — 80061 LIPID PANEL: CPT | Mod: ORL | Performed by: FAMILY MEDICINE

## 2023-10-17 ENCOUNTER — TRANSFERRED RECORDS (OUTPATIENT)
Dept: HEALTH INFORMATION MANAGEMENT | Facility: CLINIC | Age: 83
End: 2023-10-17
Payer: COMMERCIAL

## 2023-10-17 LAB
CHOLEST SERPL-MCNC: 212 MG/DL
HDLC SERPL-MCNC: 56 MG/DL
LDLC SERPL CALC-MCNC: 138 MG/DL
NONHDLC SERPL-MCNC: 156 MG/DL
TRIGL SERPL-MCNC: 92 MG/DL

## 2024-09-10 ENCOUNTER — TRANSCRIBE ORDERS (OUTPATIENT)
Dept: OTHER | Age: 84
End: 2024-09-10

## 2024-09-10 DIAGNOSIS — R20.2 NUMBNESS AND TINGLING IN RIGHT HAND: Primary | ICD-10-CM

## 2024-09-10 DIAGNOSIS — R20.0 NUMBNESS AND TINGLING IN RIGHT HAND: Primary | ICD-10-CM

## 2024-12-10 ENCOUNTER — OFFICE VISIT (OUTPATIENT)
Dept: NEUROLOGY | Facility: CLINIC | Age: 84
End: 2024-12-10
Attending: FAMILY MEDICINE
Payer: MEDICARE

## 2024-12-10 DIAGNOSIS — R20.0 NUMBNESS AND TINGLING IN RIGHT HAND: Primary | ICD-10-CM

## 2024-12-10 DIAGNOSIS — R20.2 NUMBNESS AND TINGLING IN RIGHT HAND: Primary | ICD-10-CM

## 2024-12-10 PROCEDURE — 99207 PR NO CHARGE NURSE ONLY: CPT | Performed by: PSYCHIATRY & NEUROLOGY

## 2024-12-10 PROCEDURE — 95909 NRV CNDJ TST 5-6 STUDIES: CPT | Performed by: PSYCHIATRY & NEUROLOGY

## 2024-12-10 NOTE — PROCEDURES
[facility]    Formerly [facility]  [address] [place] [address], [address]  [place] [address]  Tel:  [phone]   Fax: [phone]    Patient: Gemma GUNN Gender: Female   MRN: [id number] : 1940       Visit Date: 12/10/2024 10:41:18 AM Interpreted by: Gio Gaxiola MD   Age: 84 years Ref Provider: Carol Sanchez MD     Reason for visit:  Evaluate right upper. c/o numbness, tingling, burning in right hand > 2 years. Decline to continue. Testing terminated per patient request.   NCS+  Motor Sites      Latency Amplitude Distance Velocity   Site (ms) Norm (mV) Norm (cm) (m/s) Norm   Right Median (APB)   Wrist NR  < 4.5 NR  > 3.0 7     Elbow NR - NR - 26 NR  > 50     NCS+  Sensory Sites      Onset Lat Peak Lat Amp (O-P) Distance Velocity   Site ms (ms)  V Norm cm m/s Norm   Right Median   Wrist-Dig II NR NR NR  > 15 13 NR  > 50   Palm-Wrist NR NR NR - 8 NR -   Right Ulnar   Wrist-Dig V 2.2 2.9 14  > 5 11 50  > 50   Palm-Wrist 1.38 2.1 9 - 8 58 -     Inter-Nerve Comparisons     Nerve 1 Value 1 Nerve 2 Value 2 Parameter Result Normal   Sensory Sites   R Median Palm-Wrist - R Ulnar Palm-Wrist 2.1 ms Peak Lat Diff - <0.40       Decline to continue.  Incomplete study.    Right median motor conduction study absent  Right median snap potential absent  Right median palmar latency absent    Right ulnar snap potential normal  Right ulnar palmar latency normal    Patient did not want to complete the study  Did not want any of the needle examination  I did explain to her that she was sent to have the study done that we were not doing a full consultation and giving her recommendations on any treatment.  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    Impression  1.  Incomplete study  2.  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

## 2024-12-10 NOTE — LETTER
12/10/2024      Gemma Cabrera  [address]  [place] [address]      Dear Colleague,    Thank you for referring your patient, Gemma Cabrera, to the [facility] [place]. Please see a copy of my visit note below.    See [facility] report      Again, thank you for allowing me to participate in the care of your patient.        Sincerely,        rosa Gaxiola MD

## 2024-12-11 ENCOUNTER — PATIENT OUTREACH (OUTPATIENT)
Dept: CARE COORDINATION | Facility: CLINIC | Age: 84
End: 2024-12-11
Payer: COMMERCIAL

## 2025-05-14 ENCOUNTER — LAB REQUISITION (OUTPATIENT)
Dept: LAB | Facility: CLINIC | Age: 85
End: 2025-05-14
Payer: COMMERCIAL

## 2025-05-14 DIAGNOSIS — E78.5 HYPERLIPIDEMIA, UNSPECIFIED: ICD-10-CM

## 2025-05-14 PROCEDURE — 80061 LIPID PANEL: CPT | Mod: ORL | Performed by: FAMILY MEDICINE

## 2025-05-14 PROCEDURE — 80053 COMPREHEN METABOLIC PANEL: CPT | Mod: ORL | Performed by: FAMILY MEDICINE

## 2025-05-15 LAB
ALBUMIN SERPL BCG-MCNC: 4.5 G/DL (ref 3.5–5.2)
ALP SERPL-CCNC: 70 U/L (ref 40–150)
ALT SERPL W P-5'-P-CCNC: 13 U/L (ref 0–50)
ANION GAP SERPL CALCULATED.3IONS-SCNC: 9 MMOL/L (ref 7–15)
AST SERPL W P-5'-P-CCNC: 23 U/L (ref 0–45)
BILIRUB SERPL-MCNC: 0.6 MG/DL
BUN SERPL-MCNC: 12.5 MG/DL (ref 8–23)
CALCIUM SERPL-MCNC: 10 MG/DL (ref 8.8–10.4)
CHLORIDE SERPL-SCNC: 104 MMOL/L (ref 98–107)
CHOLEST SERPL-MCNC: 228 MG/DL
CREAT SERPL-MCNC: 0.67 MG/DL (ref 0.51–0.95)
EGFRCR SERPLBLD CKD-EPI 2021: 85 ML/MIN/1.73M2
FASTING STATUS PATIENT QL REPORTED: NO
FASTING STATUS PATIENT QL REPORTED: NO
GLUCOSE SERPL-MCNC: 111 MG/DL (ref 70–99)
HCO3 SERPL-SCNC: 29 MMOL/L (ref 22–29)
HDLC SERPL-MCNC: 54 MG/DL
LDLC SERPL CALC-MCNC: 156 MG/DL
NONHDLC SERPL-MCNC: 174 MG/DL
POTASSIUM SERPL-SCNC: 4.2 MMOL/L (ref 3.4–5.3)
PROT SERPL-MCNC: 6.9 G/DL (ref 6.4–8.3)
SODIUM SERPL-SCNC: 142 MMOL/L (ref 135–145)
TRIGL SERPL-MCNC: 91 MG/DL